# Patient Record
Sex: MALE | Race: OTHER | Employment: FULL TIME | URBAN - NONMETROPOLITAN AREA
[De-identification: names, ages, dates, MRNs, and addresses within clinical notes are randomized per-mention and may not be internally consistent; named-entity substitution may affect disease eponyms.]

---

## 2021-03-01 LAB
CHOLESTEROL, TOTAL: 117 MG/DL
CHOLESTEROL/HDL RATIO: 3.4
HDLC SERPL-MCNC: 34 MG/DL (ref 35–70)
LDL CHOLESTEROL CALCULATED: 65 MG/DL (ref 0–160)
NONHDLC SERPL-MCNC: 1.9 MG/DL
TRIGL SERPL-MCNC: 88 MG/DL
VLDLC SERPL CALC-MCNC: 18 MG/DL

## 2021-04-12 ENCOUNTER — OFFICE VISIT (OUTPATIENT)
Dept: FAMILY MEDICINE CLINIC | Age: 66
End: 2021-04-12
Payer: MEDICARE

## 2021-04-12 VITALS
SYSTOLIC BLOOD PRESSURE: 124 MMHG | OXYGEN SATURATION: 98 % | RESPIRATION RATE: 12 BRPM | BODY MASS INDEX: 28 KG/M2 | WEIGHT: 200 LBS | TEMPERATURE: 98.5 F | HEART RATE: 64 BPM | DIASTOLIC BLOOD PRESSURE: 68 MMHG | HEIGHT: 71 IN

## 2021-04-12 DIAGNOSIS — K90.89 OTHER INTESTINAL MALABSORPTION: ICD-10-CM

## 2021-04-12 DIAGNOSIS — K59.01 SLOW TRANSIT CONSTIPATION: ICD-10-CM

## 2021-04-12 DIAGNOSIS — G89.29 CHRONIC PAIN OF BOTH KNEES: ICD-10-CM

## 2021-04-12 DIAGNOSIS — E78.5 ELEVATED LIPIDS: ICD-10-CM

## 2021-04-12 DIAGNOSIS — M25.562 CHRONIC PAIN OF BOTH KNEES: ICD-10-CM

## 2021-04-12 DIAGNOSIS — M25.542 ARTHRALGIA OF BOTH HANDS: Primary | ICD-10-CM

## 2021-04-12 DIAGNOSIS — I10 ESSENTIAL HYPERTENSION: ICD-10-CM

## 2021-04-12 DIAGNOSIS — M25.541 ARTHRALGIA OF BOTH HANDS: Primary | ICD-10-CM

## 2021-04-12 DIAGNOSIS — R73.09 LOW GLUCOSE LEVEL: ICD-10-CM

## 2021-04-12 DIAGNOSIS — R35.1 NOCTURIA: ICD-10-CM

## 2021-04-12 DIAGNOSIS — M25.561 CHRONIC PAIN OF BOTH KNEES: ICD-10-CM

## 2021-04-12 PROCEDURE — 99204 OFFICE O/P NEW MOD 45 MIN: CPT | Performed by: FAMILY MEDICINE

## 2021-04-12 RX ORDER — TADALAFIL 20 MG/1
TABLET ORAL
COMMUNITY
Start: 2021-03-19

## 2021-04-12 RX ORDER — ATORVASTATIN CALCIUM 40 MG/1
40 TABLET, FILM COATED ORAL 2 TIMES DAILY
COMMUNITY
End: 2022-10-18 | Stop reason: SDUPTHER

## 2021-04-12 SDOH — HEALTH STABILITY: MENTAL HEALTH: HOW MANY STANDARD DRINKS CONTAINING ALCOHOL DO YOU HAVE ON A TYPICAL DAY?: NOT ASKED

## 2021-04-12 SDOH — ECONOMIC STABILITY: FOOD INSECURITY: WITHIN THE PAST 12 MONTHS, YOU WORRIED THAT YOUR FOOD WOULD RUN OUT BEFORE YOU GOT MONEY TO BUY MORE.: NEVER TRUE

## 2021-04-12 ASSESSMENT — ENCOUNTER SYMPTOMS
ALLERGIC/IMMUNOLOGIC NEGATIVE: 1
CONSTIPATION: 1

## 2021-04-12 ASSESSMENT — PATIENT HEALTH QUESTIONNAIRE - PHQ9
SUM OF ALL RESPONSES TO PHQ QUESTIONS 1-9: 0
2. FEELING DOWN, DEPRESSED OR HOPELESS: 0

## 2021-04-12 NOTE — PATIENT INSTRUCTIONS
Thank you   1. Thank you for trusting us with your healthcare needs. You may receive a survey regarding today's visit. It would help us out if you would take a few moments to provide your feedback. We value your input. 2. Please bring in ALL medications BOTTLES, including inhalers, herbal supplements, over the counter, prescribed & non-prescribed medicine. The office would like actual medication bottles and a list.   3. Please note our OFFICE POLICIES:   a. Prior to getting your labs drawn, please check with your insurance company for benefits and eligibility of lab services. Often, insurance companies cover certain tests for preventative visits only. It is patient's responsibility to see what is covered. b. We are unable to change a diagnosis after the test has been performed. c. Lab orders will not be re-printed. Please hold onto your original lab orders and take them to your lab to be completed. d. If you no show your scheduled appointment three times, you will be dismissed from this practice. e. Karenann Mitts must be completed 24 hours prior to your schedule appointment. 4. If the list below has been completed, PLEASE FAX RECORDS TO OUR OFFICE @ [phone].  Once the records have been received we will update your records at our office:  Health Maintenance Due   Topic Date Due    Hepatitis C screen  Never done    COVID-19 Vaccine (1) Never done    DTaP/Tdap/Td vaccine (1 - Tdap) Never done    Shingles Vaccine (1 of 2) Never done    Colon cancer screen colonoscopy  Never done    Lipid screen  07/06/2017    Pneumococcal 65+ years Vaccine (1 of 1 - PPSV23) Never done

## 2021-04-13 LAB
ABSOLUTE BASO #: 0 X10E9/L (ref 0–0.2)
ABSOLUTE EOS #: 0.2 X10E9/L (ref 0–0.4)
ABSOLUTE LYMPH #: 1.4 X10E9/L (ref 1–3.5)
ABSOLUTE MONO #: 0.4 X10E9/L (ref 0–0.9)
ABSOLUTE NEUT #: 2.6 X10E9/L (ref 1.5–6.6)
AMYLASE: 52 U/L (ref 28–100)
ANA PATTERN: NORMAL
ANA TITER: NORMAL TITER
ANTI-NUCLEAR ANTIBODY (ANA): POSITIVE
ANTI-THYROGLOB ABS: <1 IU/ML
AVERAGE GLUCOSE: 108 MG/DL
BASOPHILS RELATIVE PERCENT: 1 %
CALCIUM SERPL-MCNC: 9.3 MG/DL (ref 8.5–10.5)
DEHYDROEPIANDROSTERONE: 1.2 NG/ML (ref 1.8–12.5)
DHEAS (DHEA SULFATE): 54 UG/DL (ref 24–244)
EOSINOPHILS RELATIVE PERCENT: 3.7 %
FERRITIN: 170 NG/ML (ref 24–336)
FOLATE: 11.7 NG/ML
HBA1C MFR BLD: 5.4 % (ref 4.4–6.4)
HCT VFR BLD CALC: 40.1 % (ref 39–49)
HEMOGLOBIN: 14.7 G/DL (ref 13–17)
HIGH SENSITIVE C-REACTIVE PROTEIN: 0.03 MG/DL (ref 0–0.74)
HOMOCYSTINE, SERUM: 10.1 MCMOL/L (ref 5.9–16)
IGE: 175 IU/ML (ref 0–165)
IRON SATURATION: 20 % SATURATION (ref 20–50)
IRON, SERUM: 67 UG/DL (ref 50–212)
LIPASE: 32 U/L (ref 11–82)
LYMPHOCYTE %: 30.6 %
MAGNESIUM: 2 MG/DL (ref 1.8–2.6)
MCH RBC QN AUTO: 33.1 PG (ref 27–34)
MCHC RBC AUTO-ENTMCNC: 36.5 G/DL (ref 32–36)
MCV RBC AUTO: 91 FL (ref 80–100)
MONOCYTES # BLD: 8.5 %
NEUTROPHILS RELATIVE PERCENT: 56.2 %
PARATHYROID HORMONE INTACT: 36 PG/ML (ref 12–88)
PDW BLD-RTO: 12.3 % (ref 11.5–15)
PLATELETS: 145 X10E9/L (ref 150–450)
PMV BLD AUTO: 10.8 FL (ref 7–12)
PSA, ULTRASENSITIVE: 0.78 NG/ML (ref 0–4)
RBC: 4.42 X10E12/L (ref 4.1–5.7)
RHEUMATOID FACTOR: <10 IU/ML
SEDIMENTATION RATE, ERYTHROCYTE: 6 MM/H (ref 0–20)
T3 FREE: 2.92 PG/ML (ref 2.5–3.9)
T3 TOTAL: 101 NG/DL (ref 87–178)
T4 FREE: 0.65 NG/DL (ref 0.61–1.6)
T4 TOTAL: 6 UG/DL (ref 6.1–12.2)
TESTOSTERONE FREE-MALE: 40.7 PG/ML (ref 47–244)
THYROGLOBULIN: 9.3 NG/ML (ref 0–35)
THYROID PEROXIDASE ANTIBODY: <1 IU/ML
TOTAL IRON BINDING CAPACITY: 328 UG/DL (ref 250–425)
TSH SERPL DL<=0.05 MIU/L-ACNC: 2.55 UIU/ML (ref 0.49–4.67)
VITAMIN B-12: 502 PG/ML (ref 180–914)
VITAMIN B12 BIND CAP: 945 PG/ML (ref 800–2600)
VITAMIN D 25-HYDROXY: 32.7 NG/ML (ref 30–100)
WBC: 4.6 X10E9/L (ref 4–11)

## 2021-04-19 NOTE — LETTER
[address] [place] [address],[address] [id number]  Phone: [phone]  Fax: [phone]    Susi Salazar MD        April 19, 2021    [phone]      Dear Alvaro Peña:    Here are the hard copies of your lab results from [facility]    If you have any questions or concerns, please don't hesitate to call.     Sincerely,        Susi Salazar MD

## 2022-05-07 ENCOUNTER — HOSPITAL ENCOUNTER (OUTPATIENT)
Age: 67
Discharge: HOME | End: 2022-05-07
Payer: MEDICARE

## 2022-05-07 DIAGNOSIS — E78.2 MIXED HYPERLIPIDEMIA: ICD-10-CM

## 2022-05-07 LAB
CHOLESTEROL, TOTAL: 170 MG/DL (ref 100–199)
HDLC SERPL-MCNC: 37 MG/DL
LDL CHOLESTEROL CALCULATED: 103 MG/DL
TRIGL SERPL-MCNC: 148 MG/DL (ref 0–199)

## 2022-05-07 PROCEDURE — 36415 COLL VENOUS BLD VENIPUNCTURE: CPT

## 2022-05-07 PROCEDURE — 80061 LIPID PANEL: CPT

## 2024-05-04 ENCOUNTER — HOSPITAL ENCOUNTER (OUTPATIENT)
Age: 69
Discharge: HOME | End: 2024-05-04
Payer: MEDICARE

## 2024-05-04 DIAGNOSIS — Z51.81 THERAPEUTIC DRUG MONITORING: ICD-10-CM

## 2024-05-04 DIAGNOSIS — E78.2 MIXED HYPERLIPIDEMIA: ICD-10-CM

## 2024-05-04 LAB
CHOLEST SERPL-MCNC: 84 MG/DL (ref 100–199)
HDLC SERPL-MCNC: 41 MG/DL
LDLC SERPL CALC-MCNC: 9 MG/DL
TRIGL SERPL-MCNC: 172 MG/DL (ref 0–199)

## 2024-05-04 PROCEDURE — 36415 COLL VENOUS BLD VENIPUNCTURE: CPT

## 2024-05-04 PROCEDURE — 80061 LIPID PANEL: CPT

## 2024-07-27 ENCOUNTER — HOSPITAL ENCOUNTER (OUTPATIENT)
Age: 69
Discharge: HOME | End: 2024-07-27
Payer: MEDICARE

## 2024-07-27 DIAGNOSIS — Z79.899 ENCOUNTER FOR MONITORING STATIN THERAPY: ICD-10-CM

## 2024-07-27 DIAGNOSIS — Z13.1 DIABETES MELLITUS SCREENING: ICD-10-CM

## 2024-07-27 DIAGNOSIS — Z51.81 ENCOUNTER FOR MONITORING STATIN THERAPY: ICD-10-CM

## 2024-07-27 DIAGNOSIS — E78.2 MIXED HYPERLIPIDEMIA: ICD-10-CM

## 2024-07-27 LAB
ALBUMIN SERPL BCG-MCNC: 4.2 G/DL (ref 3.5–5.1)
ALP SERPL-CCNC: 63 U/L (ref 38–126)
ALT SERPL W/O P-5'-P-CCNC: 24 U/L (ref 11–66)
AST SERPL-CCNC: 19 U/L (ref 5–40)
BILIRUB CONJ SERPL-MCNC: 0.2 MG/DL (ref 0.1–13.8)
BILIRUB SERPL-MCNC: 0.6 MG/DL (ref 0.3–1.2)
CHOLEST SERPL-MCNC: 134 MG/DL (ref 100–199)
DEPRECATED MEAN GLUCOSE BLD GHB EST-ACNC: 108 MG/DL (ref 70–126)
HBA1C MFR BLD HPLC: 5.6 % (ref 4.4–6.4)
HDLC SERPL-MCNC: 44 MG/DL
LDLC SERPL CALC-MCNC: 66 MG/DL
PROT SERPL-MCNC: 6.9 G/DL (ref 6.1–8)
TRIGL SERPL-MCNC: 121 MG/DL (ref 0–199)

## 2024-07-27 PROCEDURE — 80076 HEPATIC FUNCTION PANEL: CPT

## 2024-07-27 PROCEDURE — 80061 LIPID PANEL: CPT

## 2024-07-27 PROCEDURE — 83036 HEMOGLOBIN GLYCOSYLATED A1C: CPT

## 2024-07-27 PROCEDURE — 36415 COLL VENOUS BLD VENIPUNCTURE: CPT

## 2024-09-07 DIAGNOSIS — E78.2 MIXED HYPERLIPIDEMIA: Primary | ICD-10-CM

## 2024-09-07 RX ORDER — ATORVASTATIN CALCIUM 20 MG/1
20 TABLET, FILM COATED ORAL DAILY
Qty: 90 TABLET | Refills: 3 | Status: SHIPPED | OUTPATIENT
Start: 2024-09-07 | End: 2025-09-02

## 2024-10-16 ENCOUNTER — HOSPITAL ENCOUNTER (OUTPATIENT)
Age: 69
Discharge: HOME | End: 2024-10-16
Payer: MEDICARE

## 2024-10-16 DIAGNOSIS — E78.2 MIXED HYPERLIPIDEMIA: ICD-10-CM

## 2024-10-16 LAB
CHOLEST SERPL-MCNC: 160 MG/DL (ref 100–199)
HDLC SERPL-MCNC: 30 MG/DL
LDLC SERPL CALC-MCNC: ABNORMAL MG/DL
TRIGL SERPL-MCNC: 526 MG/DL (ref 0–199)

## 2024-10-16 PROCEDURE — 80061 LIPID PANEL: CPT

## 2024-10-16 PROCEDURE — 36415 COLL VENOUS BLD VENIPUNCTURE: CPT

## 2025-01-04 ENCOUNTER — HOSPITAL ENCOUNTER (OUTPATIENT)
Age: 70
Discharge: HOME | End: 2025-01-04
Payer: MEDICARE

## 2025-01-04 DIAGNOSIS — E78.2 MIXED HYPERLIPIDEMIA: ICD-10-CM

## 2025-01-04 LAB
CHOLEST SERPL-MCNC: 115 MG/DL (ref 100–199)
HDLC SERPL-MCNC: 37 MG/DL
LDLC SERPL CALC-MCNC: 56 MG/DL
TRIGL SERPL-MCNC: 108 MG/DL (ref 0–199)

## 2025-01-04 PROCEDURE — 36415 COLL VENOUS BLD VENIPUNCTURE: CPT

## 2025-01-04 PROCEDURE — 80061 LIPID PANEL: CPT

## 2025-01-07 ENCOUNTER — TELEPHONE (OUTPATIENT)
Dept: FAMILY MEDICINE CLINIC | Age: 70
End: 2025-01-07

## 2025-01-07 NOTE — TELEPHONE ENCOUNTER
Pt was last seen in 2021, would like to know if he can follow up - states that his cell phone doesn't take calls from unknown numbers, would like a detailed message left and he will call back.

## 2025-01-08 NOTE — TELEPHONE ENCOUNTER
I called Dr. Murillo & asked if ok to do a video visit since they are both busy physicians.  He said ok to do a video visit.    I called and left a message on [employer] voice mail to call back and schedule, Monday - Friday, between 8 am -1 pm.  If any complications with scheduling staff, please call and speak with Alexandra.

## 2025-02-10 NOTE — PROGRESS NOTES
pharmaceutical grade [employer] omega 3 oil or triple-strength fish oil, and B-50/B-100 time-released B-complex by [employer] will be for a time-limited trial to determine their individual effectiveness and safety in this patient.  I also referred the patient to the NMCD: Nutrition, Metabolism, and Cardiovascular Diseases (https://www.nmcd-journal.com/) and concerns about long-term use and hepatotoxicity of cinnamon and other nutrients.  I suggested they frequently search nih.gov for the latest non-proprietary information on nutriceuticals as well as consider a subscription to [employer] for details on reviewed supplements, or at the least review the nutrient files at [facility], [email or web address]     Cinnamon bark, an insulin mimetic, reduces some High Carbohydrate Dietary Impacts.  Methylhydroxychalcone polymer’s insulin-enhancing properties in fat cells are responsible for enhanced glucose uptake, inhibiting hepatic HMG-CoA reductase and lowers lipids. www.jacn.org/content/20/4/327.full     But cinnamon with additives such as Cinnamon Extract are not effective as insulin mimetics. ://www.ars.usda.gov/is/AR/archive/apr04/ejdtsb7999.htm     Nutrients for Start up from Local pharmacy or grocery for ease to get started now;  [employer] has some useable products;  - Triple Strength Fish Oil, enteric coated  - Vit D-3 5000 IU gel caps  - Iron ferrous sulfate 325 mg tabs  - Centrum Silver look-a-like for most patients, or  - Centrum plain look-a-like if need iron    Local pharmacies or chains such as "[employer] [place]", have:  - [employer] pharmaceutical grade omega 3 is 90% EPA/DHA whereas most Triple strength fish oil are 75% EPA/DHA  - Triple Strength Fish Oil (enteric coated if available) or if not enteric coated, can take from freezer for less burps  - B-50 or B-100 released balanced B complex tabs by [employer] at [facility]  - Cinnamon bark 500 mg (without Chromium or

## 2025-02-11 ENCOUNTER — TELEMEDICINE (OUTPATIENT)
Dept: FAMILY MEDICINE CLINIC | Age: 70
End: 2025-02-11

## 2025-02-11 DIAGNOSIS — R39.11 HESITANCY OF MICTURITION: ICD-10-CM

## 2025-02-11 DIAGNOSIS — D64.9 LOW HEMATOCRIT: ICD-10-CM

## 2025-02-11 DIAGNOSIS — R94.4 DECREASED GLOMERULAR FILTRATION RATE: ICD-10-CM

## 2025-02-11 DIAGNOSIS — Z71.89 ENCOUNTER FOR HERB AND VITAMIN SUPPLEMENT MANAGEMENT: ICD-10-CM

## 2025-02-11 DIAGNOSIS — H15.101 EPISCLERITIS OF RIGHT EYE: ICD-10-CM

## 2025-02-11 DIAGNOSIS — M79.673 PAIN OF FOOT, UNSPECIFIED LATERALITY: ICD-10-CM

## 2025-02-11 DIAGNOSIS — R76.8 ANA POSITIVE: ICD-10-CM

## 2025-02-11 DIAGNOSIS — E78.89 LIPIDS ABNORMAL: ICD-10-CM

## 2025-02-11 DIAGNOSIS — E78.1 HIGH TRIGLYCERIDES: ICD-10-CM

## 2025-02-11 DIAGNOSIS — R79.89 LOW T4: Primary | ICD-10-CM

## 2025-02-11 DIAGNOSIS — Z77.018 HEAVY METAL EXPOSURE: ICD-10-CM

## 2025-02-11 DIAGNOSIS — E55.9 VITAMIN D DEFICIENCY: ICD-10-CM

## 2025-02-11 SDOH — ECONOMIC STABILITY: FOOD INSECURITY: WITHIN THE PAST 12 MONTHS, THE FOOD YOU BOUGHT JUST DIDN'T LAST AND YOU DIDN'T HAVE MONEY TO GET MORE.: PATIENT DECLINED

## 2025-02-11 SDOH — ECONOMIC STABILITY: FOOD INSECURITY: WITHIN THE PAST 12 MONTHS, YOU WORRIED THAT YOUR FOOD WOULD RUN OUT BEFORE YOU GOT MONEY TO BUY MORE.: PATIENT DECLINED

## 2025-02-19 ENCOUNTER — HOSPITAL ENCOUNTER (OUTPATIENT)
Age: 70
Discharge: HOME | End: 2025-02-19
Payer: MEDICARE

## 2025-02-19 DIAGNOSIS — Z77.018 HEAVY METAL EXPOSURE: ICD-10-CM

## 2025-02-19 DIAGNOSIS — R39.11 HESITANCY OF MICTURITION: ICD-10-CM

## 2025-02-19 DIAGNOSIS — D64.9 LOW HEMATOCRIT: ICD-10-CM

## 2025-02-19 DIAGNOSIS — E78.1 HIGH TRIGLYCERIDES: ICD-10-CM

## 2025-02-19 DIAGNOSIS — E78.89 LIPIDS ABNORMAL: ICD-10-CM

## 2025-02-19 DIAGNOSIS — R79.89 LOW T4: ICD-10-CM

## 2025-02-19 DIAGNOSIS — E55.9 VITAMIN D DEFICIENCY: ICD-10-CM

## 2025-02-19 DIAGNOSIS — M79.673 PAIN OF FOOT, UNSPECIFIED LATERALITY: ICD-10-CM

## 2025-02-19 DIAGNOSIS — R94.4 DECREASED GLOMERULAR FILTRATION RATE: ICD-10-CM

## 2025-02-19 DIAGNOSIS — R76.8 ANA POSITIVE: ICD-10-CM

## 2025-02-19 DIAGNOSIS — H15.101 EPISCLERITIS OF RIGHT EYE: ICD-10-CM

## 2025-02-19 DIAGNOSIS — Z71.89 ENCOUNTER FOR HERB AND VITAMIN SUPPLEMENT MANAGEMENT: ICD-10-CM

## 2025-02-19 LAB
25(OH)D3 SERPL-MCNC: 23 NG/ML (ref 30–100)
ALBUMIN SERPL BCG-MCNC: 4.6 G/DL (ref 3.5–5.1)
ALP SERPL-CCNC: 56 U/L (ref 38–126)
ALT SERPL W/O P-5'-P-CCNC: 27 U/L (ref 11–66)
AMYLASE SERPL-CCNC: 66 U/L (ref 20–104)
ANION GAP SERPL CALC-SCNC: 16 MEQ/L (ref 8–16)
AST SERPL-CCNC: 23 U/L (ref 5–40)
BASOPHILS ABSOLUTE: 0 THOU/MM3 (ref 0–0.1)
BASOPHILS NFR BLD AUTO: 1 %
BILIRUB CONJ SERPL-MCNC: 0.2 MG/DL (ref 0–0.2)
BILIRUB SERPL-MCNC: 0.7 MG/DL (ref 0.3–1.2)
BUN SERPL-MCNC: 15 MG/DL (ref 7–22)
CALCIUM SERPL-MCNC: 9.1 MG/DL (ref 8.2–9.6)
CHLORIDE SERPL-SCNC: 103 MEQ/L (ref 98–111)
CO2 SERPL-SCNC: 23 MEQ/L (ref 23–33)
CREAT SERPL-MCNC: 0.7 MG/DL (ref 0.4–1.2)
DEPRECATED MEAN GLUCOSE BLD GHB EST-ACNC: 105 MG/DL (ref 70–126)
DEPRECATED RDW RBC AUTO: 38.1 FL (ref 35–45)
EOSINOPHIL NFR BLD AUTO: 3.2 %
EOSINOPHILS ABSOLUTE: 0.1 THOU/MM3 (ref 0–0.4)
ERYTHROCYTE [DISTWIDTH] IN BLOOD BY AUTOMATED COUNT: 11.9 % (ref 11.5–14.5)
ERYTHROCYTE [SEDIMENTATION RATE] IN BLOOD BY WESTERGREN METHOD: 2 MM/HR (ref 0–10)
FOLATE SERPL-MCNC: 14.9 NG/ML (ref 4.6–34.8)
GFR SERPL CREATININE-BSD FRML MDRD: > 90 ML/MIN/1.73M2
GLUCOSE SERPL-MCNC: 100 MG/DL (ref 70–108)
HBA1C MFR BLD HPLC: 5.5 % (ref 4.4–6.4)
HCT VFR BLD AUTO: 41.9 % (ref 42–52)
HGB BLD-MCNC: 14.3 GM/DL (ref 14–18)
IMM GRANULOCYTES # BLD AUTO: 0.01 THOU/MM3 (ref 0–0.07)
IMM GRANULOCYTES NFR BLD AUTO: 0.2 %
IRON SERPL-MCNC: 120 UG/DL (ref 65–195)
LIPASE SERPL-CCNC: 21.2 U/L (ref 5.6–51.3)
LYMPHOCYTES ABSOLUTE: 1.1 THOU/MM3 (ref 1–4.8)
LYMPHOCYTES NFR BLD AUTO: 27.9 %
MAGNESIUM SERPL-MCNC: 2.1 MG/DL (ref 1.6–2.4)
MCH RBC QN AUTO: 30.2 PG (ref 26–33)
MCHC RBC AUTO-ENTMCNC: 34.1 GM/DL (ref 32.2–35.5)
MCV RBC AUTO: 88.6 FL (ref 80–94)
MONOCYTES ABSOLUTE: 0.4 THOU/MM3 (ref 0.4–1.3)
MONOCYTES NFR BLD AUTO: 9.1 %
NEUTROPHILS ABSOLUTE: 2.4 THOU/MM3 (ref 1.8–7.7)
NEUTROPHILS NFR BLD AUTO: 58.6 %
NRBC BLD AUTO-RTO: 0 /100 WBC
PLATELET # BLD AUTO: 149 THOU/MM3 (ref 130–400)
PMV BLD AUTO: 11.1 FL (ref 9.4–12.4)
POTASSIUM SERPL-SCNC: 4.2 MEQ/L (ref 3.5–5.2)
PROT SERPL-MCNC: 7.4 G/DL (ref 6.1–8)
PSA SERPL-MCNC: 1.42 NG/ML (ref 0–1)
PTH-INTACT SERPL-MCNC: 59.1 PG/ML (ref 15–65)
RBC # BLD AUTO: 4.73 MILL/MM3 (ref 4.7–6.1)
RHEUMATOID FACT SERPL-ACNC: < 10 IU/ML (ref 0–13)
SODIUM SERPL-SCNC: 142 MEQ/L (ref 135–145)
T4 FREE SERPL-MCNC: 0.98 NG/DL (ref 0.92–1.68)
TIBC SERPL-MCNC: 285 UG/DL (ref 171–450)
TSH SERPL DL<=0.005 MIU/L-ACNC: 2.63 UIU/ML (ref 0.4–4.2)
URATE SERPL-MCNC: 6.2 MG/DL (ref 3.7–7)
VIT B12 SERPL-MCNC: 722 PG/ML (ref 211–911)
WBC # BLD AUTO: 4.1 THOU/MM3 (ref 4.8–10.8)

## 2025-02-19 PROCEDURE — 86039 ANTINUCLEAR ANTIBODIES (ANA): CPT

## 2025-02-19 PROCEDURE — 84255 ASSAY OF SELENIUM: CPT

## 2025-02-19 PROCEDURE — 84550 ASSAY OF BLOOD/URIC ACID: CPT

## 2025-02-19 PROCEDURE — 82627 DEHYDROEPIANDROSTERONE: CPT

## 2025-02-19 PROCEDURE — 82248 BILIRUBIN DIRECT: CPT

## 2025-02-19 PROCEDURE — 84443 ASSAY THYROID STIM HORMONE: CPT

## 2025-02-19 PROCEDURE — 83036 HEMOGLOBIN GLYCOSYLATED A1C: CPT

## 2025-02-19 PROCEDURE — 82306 VITAMIN D 25 HYDROXY: CPT

## 2025-02-19 PROCEDURE — 84403 ASSAY OF TOTAL TESTOSTERONE: CPT

## 2025-02-19 PROCEDURE — 82175 ASSAY OF ARSENIC: CPT

## 2025-02-19 PROCEDURE — 85025 COMPLETE CBC W/AUTO DIFF WBC: CPT

## 2025-02-19 PROCEDURE — 83002 ASSAY OF GONADOTROPIN (LH): CPT

## 2025-02-19 PROCEDURE — 84402 ASSAY OF FREE TESTOSTERONE: CPT

## 2025-02-19 PROCEDURE — 83970 ASSAY OF PARATHORMONE: CPT

## 2025-02-19 PROCEDURE — 83540 ASSAY OF IRON: CPT

## 2025-02-19 PROCEDURE — 84436 ASSAY OF TOTAL THYROXINE: CPT

## 2025-02-19 PROCEDURE — 86430 RHEUMATOID FACTOR TEST QUAL: CPT

## 2025-02-19 PROCEDURE — 84439 ASSAY OF FREE THYROXINE: CPT

## 2025-02-19 PROCEDURE — 86141 C-REACTIVE PROTEIN HS: CPT

## 2025-02-19 PROCEDURE — 83655 ASSAY OF LEAD: CPT

## 2025-02-19 PROCEDURE — 84630 ASSAY OF ZINC: CPT

## 2025-02-19 PROCEDURE — 82525 ASSAY OF COPPER: CPT

## 2025-02-19 PROCEDURE — 86376 MICROSOMAL ANTIBODY EACH: CPT

## 2025-02-19 PROCEDURE — 86038 ANTINUCLEAR ANTIBODIES: CPT

## 2025-02-19 PROCEDURE — 80053 COMPREHEN METABOLIC PANEL: CPT

## 2025-02-19 PROCEDURE — 82626 DEHYDROEPIANDROSTERONE: CPT

## 2025-02-19 PROCEDURE — 36415 COLL VENOUS BLD VENIPUNCTURE: CPT

## 2025-02-19 PROCEDURE — 84480 ASSAY TRIIODOTHYRONINE (T3): CPT

## 2025-02-19 PROCEDURE — 83825 ASSAY OF MERCURY: CPT

## 2025-02-19 PROCEDURE — 84481 FREE ASSAY (FT-3): CPT

## 2025-02-19 PROCEDURE — 84270 ASSAY OF SEX HORMONE GLOBUL: CPT

## 2025-02-19 PROCEDURE — 83090 ASSAY OF HOMOCYSTEINE: CPT

## 2025-02-19 PROCEDURE — 83001 ASSAY OF GONADOTROPIN (FSH): CPT

## 2025-02-19 PROCEDURE — 82607 VITAMIN B-12: CPT

## 2025-02-19 PROCEDURE — 83735 ASSAY OF MAGNESIUM: CPT

## 2025-02-19 PROCEDURE — 84153 ASSAY OF PSA TOTAL: CPT

## 2025-02-19 PROCEDURE — 82150 ASSAY OF AMYLASE: CPT

## 2025-02-19 PROCEDURE — 85651 RBC SED RATE NONAUTOMATED: CPT

## 2025-02-19 PROCEDURE — 83550 IRON BINDING TEST: CPT

## 2025-02-19 PROCEDURE — 82746 ASSAY OF FOLIC ACID SERUM: CPT

## 2025-02-19 PROCEDURE — 83690 ASSAY OF LIPASE: CPT

## 2025-02-19 PROCEDURE — 82670 ASSAY OF TOTAL ESTRADIOL: CPT

## 2025-02-19 PROCEDURE — 83516 IMMUNOASSAY NONANTIBODY: CPT

## 2025-02-20 LAB
C-REACTIVE PROTEIN, HIGH SENSITIVITY: < 0.2 MG/L (ref 0–3)
DHEA-S SERPL-MCNC: 63 UG/DL (ref 33.6–246)
ESTRADIOL LEVEL: 27.9 PG/ML (ref 27–52)
FOLLICLE STIMULATING HORMONE: 6 MIU/ML (ref 1.5–12.4)
GLIADIN IGG SER IA-ACNC: < 0.4 U/ML
HOMOCYSTEINE: 10.3 UMOL/L (ref 0–15)
LUTEINIZING HORMONE: 6.9 MIU/ML (ref 1.7–8.6)
SHBG SERPL-SCNC: 29 NMOL/L (ref 19–76)
T3 TOTAL: 116 NG/DL (ref 80–200)
T3FREE SERPL-MCNC: 3.45 PG/ML (ref 2–4.4)
T4 SERPL-MCNC: 6.2 UG/DL (ref 4.5–11.7)
TESTOST FREE MFR SERPL: 71.8 PG/ML (ref 47–244)
TESTOST SERPL-MCNC: 335 NG/DL (ref 193–740)
THYROPEROXIDASE AB SERPL IA-ACNC: < 4 IU/ML (ref 0–25)
TTG IGA SER IA-ACNC: 0.8 U/ML
TTG IGG SER IA-ACNC: 0.8 U/ML

## 2025-02-21 LAB
COPPER SERPL-MCNC: 73.7 UG/DL (ref 70–140)
NUCLEAR IGG SER QL IA: DETECTED
SELENIUM SERPL-MCNC: 118.4 UG/L (ref 23–190)
ZINC SERPL-MCNC: 77.2 UG/DL (ref 60–120)

## 2025-02-22 LAB
ANA PAT SER IF-IMP: ABNORMAL
ANA PAT SER IF-IMP: ABNORMAL
DHEA SERPL-MCNC: 1.16 NG/ML (ref 0.63–4.7)
HEAVY METAL: NORMAL
NUCLEAR IGG SER QL IF: DETECTED
NUCLEAR IGG TITR SER IF: ABNORMAL {TITER}

## 2025-02-24 ENCOUNTER — OFFICE VISIT (OUTPATIENT)
Age: 70
End: 2025-02-24

## 2025-02-24 VITALS
HEIGHT: 71 IN | HEART RATE: 73 BPM | BODY MASS INDEX: 29.44 KG/M2 | DIASTOLIC BLOOD PRESSURE: 78 MMHG | WEIGHT: 210.25 LBS | SYSTOLIC BLOOD PRESSURE: 118 MMHG

## 2025-02-24 DIAGNOSIS — E78.1 HYPERTRIGLYCERIDEMIA: Primary | ICD-10-CM

## 2025-02-24 RX ORDER — TAMSULOSIN HYDROCHLORIDE 0.4 MG/1
0.4 CAPSULE ORAL DAILY
COMMUNITY
Start: 2025-02-14

## 2025-02-24 NOTE — PROGRESS NOTES
nerves 2-12 intact, muscle tone and strength normal and symmetric.  Lymph nodes: There is no cervical, supraclavicular or submental adenopathy.  Musculoskeletal:  No joint swelling or deformity.  No tendon xanthomas.  Psychiatry: Alert and oriented ×3.  Making good eye contact.  Affect is normal.        Assessment/Plan:  Assessment & Plan       1. Hypertriglyceridemia: Patient with underlying history of mixed hyperlipidemia on statin therapy with effective LDL lowering but intermittent episodes of hypertriglyceridemia in large part related to diet.  He has no peripheral stigmata of hypertriglyceridemia but his father developed pancreatitis within the context of hypertriglyceridemia, implying that we are most likely dealing with a familial disorder.  He is euthyroid and has maintained a stable weight.  Currently following a stringent dietary regiment.  Lipids to be repeated but I will refer this patient to [facility] for possible genetic testing given the strong history of triglyceride disorder in his family associated with pancreatitis.  Continue with restriction of calorie and fat intake for now.  Patient will also be kept on statin therapy.     Orders Placed This Encounter   Procedures    Lipid Panel     Standing Status:   Future     Standing Expiration Date:   5/24/2025           The risks and benefits of my recommendations, as well as other treatment options were discussed with the patient today. Questions were answered.  Follow up: 3 months and as needed.         Electronically signed by Ki Cole MD 2/24/2025 3:41 PM     **This report has been created using voice recognition software. It may   contain minor errors which are inherent in voice recognition technology.**

## 2025-03-03 ENCOUNTER — HOSPITAL ENCOUNTER (OUTPATIENT)
Age: 70
Discharge: HOME | End: 2025-03-03
Payer: MEDICARE

## 2025-03-03 DIAGNOSIS — E78.1 HYPERTRIGLYCERIDEMIA: ICD-10-CM

## 2025-03-03 LAB
CHOLEST SERPL-MCNC: 123 MG/DL (ref 100–199)
HDLC SERPL-MCNC: 34 MG/DL
LDLC SERPL CALC-MCNC: 24 MG/DL
TRIGL SERPL-MCNC: 325 MG/DL (ref 0–199)

## 2025-03-03 PROCEDURE — 80061 LIPID PANEL: CPT

## 2025-03-03 PROCEDURE — 36415 COLL VENOUS BLD VENIPUNCTURE: CPT

## 2025-03-16 ENCOUNTER — RESULTS FOLLOW-UP (OUTPATIENT)
Age: 70
End: 2025-03-16

## 2025-03-18 NOTE — TELEPHONE ENCOUNTER
----- Message from Dr. Ki Cole MD sent at 3/16/2025 12:51 PM EDT -----  Triglyceride elevated.  Please f/u on [facility] referral

## 2025-03-18 NOTE — TELEPHONE ENCOUNTER
Left VM for patient. Wanting to see if they have been seen/scheduled at [facility].     ----- Message from Dr. Ki Cole MD sent at 3/16/2025 12:51 PM EDT -----  Triglyceride elevated.  Please f/u on [facility] referral

## 2025-04-02 ENCOUNTER — HOSPITAL ENCOUNTER (OUTPATIENT)
Age: 70
Discharge: HOME | End: 2025-04-02
Payer: MEDICARE

## 2025-04-02 DIAGNOSIS — Z12.5 PROSTATE CANCER SCREENING: ICD-10-CM

## 2025-04-02 LAB — PSA SERPL-MCNC: 1.39 NG/ML (ref 0–1)

## 2025-04-02 PROCEDURE — G0103 PSA SCREENING: HCPCS

## 2025-04-02 PROCEDURE — 36415 COLL VENOUS BLD VENIPUNCTURE: CPT

## 2025-05-31 ENCOUNTER — HOSPITAL ENCOUNTER (OUTPATIENT)
Age: 70
Discharge: HOME | End: 2025-05-31
Payer: MEDICARE

## 2025-05-31 DIAGNOSIS — E78.2 MIXED HYPERLIPIDEMIA: ICD-10-CM

## 2025-05-31 DIAGNOSIS — R97.20 ELEVATED PSA: ICD-10-CM

## 2025-05-31 LAB
CHOLEST SERPL-MCNC: 131 MG/DL (ref 100–199)
HDLC SERPL-MCNC: 36 MG/DL
LDLC SERPL CALC-MCNC: 47 MG/DL
PSA SERPL-MCNC: 1.27 NG/ML (ref 0–1)
TRIGL SERPL-MCNC: 242 MG/DL (ref 0–199)

## 2025-05-31 PROCEDURE — 84153 ASSAY OF PSA TOTAL: CPT

## 2025-05-31 PROCEDURE — 80061 LIPID PANEL: CPT

## 2025-05-31 PROCEDURE — 36415 COLL VENOUS BLD VENIPUNCTURE: CPT

## 2025-07-21 ENCOUNTER — TRANSCRIBE ORDERS (OUTPATIENT)
Dept: ADMINISTRATIVE | Age: 70
End: 2025-07-21

## 2025-07-21 ENCOUNTER — HOSPITAL ENCOUNTER (OUTPATIENT)
Age: 70
Discharge: HOME | End: 2025-07-21
Payer: MEDICARE

## 2025-07-21 DIAGNOSIS — R97.20 ELEVATED PROSTATE SPECIFIC ANTIGEN (PSA): Primary | ICD-10-CM

## 2025-07-21 LAB — PSA SERPL-MCNC: 1.3 NG/ML (ref 0–1)

## 2025-07-21 PROCEDURE — 84153 ASSAY OF PSA TOTAL: CPT

## 2025-07-21 PROCEDURE — 36415 COLL VENOUS BLD VENIPUNCTURE: CPT
